# Patient Record
Sex: MALE | Race: WHITE | URBAN - METROPOLITAN AREA
[De-identification: names, ages, dates, MRNs, and addresses within clinical notes are randomized per-mention and may not be internally consistent; named-entity substitution may affect disease eponyms.]

---

## 2019-12-26 ENCOUNTER — OFFICE VISIT (OUTPATIENT)
Dept: FAMILY MEDICINE CLINIC | Age: 39
End: 2019-12-26
Payer: COMMERCIAL

## 2019-12-26 VITALS
OXYGEN SATURATION: 97 % | HEART RATE: 102 BPM | DIASTOLIC BLOOD PRESSURE: 80 MMHG | TEMPERATURE: 98 F | SYSTOLIC BLOOD PRESSURE: 110 MMHG | RESPIRATION RATE: 18 BRPM | WEIGHT: 205 LBS | HEIGHT: 68 IN | BODY MASS INDEX: 31.07 KG/M2

## 2019-12-26 DIAGNOSIS — B02.9 HERPES ZOSTER WITHOUT COMPLICATION: Primary | ICD-10-CM

## 2019-12-26 PROCEDURE — 99213 OFFICE O/P EST LOW 20 MIN: CPT | Performed by: NURSE PRACTITIONER

## 2019-12-26 RX ORDER — VALACYCLOVIR HYDROCHLORIDE 1 G/1
1000 TABLET, FILM COATED ORAL 3 TIMES DAILY
Qty: 21 TABLET | Refills: 0 | Status: SHIPPED | OUTPATIENT
Start: 2019-12-26 | End: 2020-01-02

## 2019-12-26 RX ORDER — HYDROCODONE BITARTRATE AND ACETAMINOPHEN 5; 325 MG/1; MG/1
1 TABLET ORAL EVERY 8 HOURS PRN
Qty: 15 TABLET | Refills: 0 | Status: SHIPPED | OUTPATIENT
Start: 2019-12-26 | End: 2019-12-31

## 2022-01-01 LAB
SARS-COV-2: DETECTED
SOURCE: 1

## 2024-01-10 ENCOUNTER — OFFICE VISIT (OUTPATIENT)
Dept: FAMILY MEDICINE CLINIC | Age: 44
End: 2024-01-10
Payer: COMMERCIAL

## 2024-01-10 VITALS
BODY MASS INDEX: 31.07 KG/M2 | SYSTOLIC BLOOD PRESSURE: 120 MMHG | DIASTOLIC BLOOD PRESSURE: 80 MMHG | HEART RATE: 84 BPM | TEMPERATURE: 97.8 F | HEIGHT: 68 IN | WEIGHT: 205 LBS | OXYGEN SATURATION: 96 %

## 2024-01-10 DIAGNOSIS — J01.90 ACUTE SINUSITIS, RECURRENCE NOT SPECIFIED, UNSPECIFIED LOCATION: Primary | ICD-10-CM

## 2024-01-10 DIAGNOSIS — R05.9 COUGH, UNSPECIFIED TYPE: ICD-10-CM

## 2024-01-10 LAB
Lab: NORMAL
PERFORMING INSTRUMENT: NORMAL
QC PASS/FAIL: NORMAL
SARS-COV-2, POC: NORMAL

## 2024-01-10 PROCEDURE — 99213 OFFICE O/P EST LOW 20 MIN: CPT

## 2024-01-10 PROCEDURE — 87426 SARSCOV CORONAVIRUS AG IA: CPT

## 2024-01-10 RX ORDER — CEFDINIR 300 MG/1
300 CAPSULE ORAL 2 TIMES DAILY
Qty: 20 CAPSULE | Refills: 0 | Status: SHIPPED | OUTPATIENT
Start: 2024-01-10 | End: 2024-01-20

## 2024-01-10 RX ORDER — CLOTRIMAZOLE AND BETAMETHASONE DIPROPIONATE 10; .64 MG/G; MG/G
CREAM TOPICAL 2 TIMES DAILY
COMMUNITY
Start: 2023-12-27

## 2024-01-10 RX ORDER — MIRTAZAPINE 15 MG/1
15 TABLET, FILM COATED ORAL NIGHTLY
COMMUNITY
Start: 2023-11-07

## 2024-01-10 RX ORDER — METHYLPREDNISOLONE 4 MG/1
TABLET ORAL
Qty: 1 KIT | Refills: 0 | Status: SHIPPED | OUTPATIENT
Start: 2024-01-10

## 2024-01-10 NOTE — PROGRESS NOTES
Chief Complaint       Pharyngitis (Drainage throat), Cough (Yellowish mucous), and Facial Pain (Pressure, all symptoms started about 4 days)    History of Present Illness   Source of history provided by:  patient.      Daniel Tanner is a 43 y.o. old male presenting to the walk in clinic for evaluation of sinus pressure, nasal congestion, discolored nasal drainage, bilateral ear pressure, mild non-productive cough x 4-5 days. Has been taking nothing OTC without relief. Denies any fever, chills, wheezing, CP, SOB, or GI symptoms. Denies any hx of asthma, COPD, or tobacco use. Pt has not been vaccinated for COVID-19.    ROS    Unless otherwise stated in this report or unable to obtain because of the patient's clinical or mental status as evidenced by the medical record, this patients's positive and negative responses for Review of Systems, constitutional, psych, eyes, ENT, cardiovascular, respiratory, gastrointestinal, neurological, genitourinary, musculoskeletal, integument systems and systems related to the presenting problem are either stated in the preceding or were not pertinent or were negative for the symptoms and/or complaints related to the medical problem.    Physical Exam         VS:  /80   Pulse 84   Temp 97.8 °F (36.6 °C) (Temporal)   Ht 1.727 m (5' 8\")   Wt 93 kg (205 lb)   SpO2 96%   BMI 31.17 kg/m²    Oxygen Saturation Interpretation: Normal.    Constitutional:  Alert, development consistent with age.  Head: Mild TTP over the maxillary sinuses.  Ears:  External Ears: Bilateral pinna normal. TMs translucent without erythema or perforation bilaterally.  Canals normal bilaterally without swelling or exudate  Nose:  Mild congestion of the nasal mucosa. There is no injection to middle turbinates bilaterally.   Throat: Mild posterior pharyngeal erythema with mild post nasal drip present.  No exudate or tonsillar hypertrophy noted.    Neck:  Supple. There is no anterior cervical

## 2025-02-12 ENCOUNTER — APPOINTMENT (OUTPATIENT)
Dept: GENERAL RADIOLOGY | Age: 45
End: 2025-02-12
Payer: COMMERCIAL

## 2025-02-12 ENCOUNTER — APPOINTMENT (OUTPATIENT)
Dept: CT IMAGING | Age: 45
End: 2025-02-12
Payer: COMMERCIAL

## 2025-02-12 ENCOUNTER — HOSPITAL ENCOUNTER (INPATIENT)
Age: 45
LOS: 1 days | Discharge: HOME | End: 2025-02-14
Attending: EMERGENCY MEDICINE | Admitting: HOSPITALIST
Payer: COMMERCIAL

## 2025-02-12 DIAGNOSIS — N28.9 KIDNEY LESION: ICD-10-CM

## 2025-02-12 DIAGNOSIS — R09.02 HYPOXIA: Primary | ICD-10-CM

## 2025-02-12 DIAGNOSIS — J18.9 PNEUMONIA DUE TO INFECTIOUS ORGANISM, UNSPECIFIED LATERALITY, UNSPECIFIED PART OF LUNG: ICD-10-CM

## 2025-02-12 LAB
ALBUMIN SERPL-MCNC: 4.2 G/DL (ref 3.5–5.2)
ALP SERPL-CCNC: 88 U/L (ref 40–129)
ALT SERPL-CCNC: 35 U/L (ref 0–40)
ANION GAP SERPL CALCULATED.3IONS-SCNC: 11 MMOL/L (ref 7–16)
AST SERPL-CCNC: 25 U/L (ref 0–39)
BASOPHILS # BLD: 0.04 K/UL (ref 0–0.2)
BASOPHILS NFR BLD: 0 % (ref 0–2)
BILIRUB SERPL-MCNC: 0.6 MG/DL (ref 0–1.2)
BUN SERPL-MCNC: 18 MG/DL (ref 6–20)
CALCIUM SERPL-MCNC: 9.1 MG/DL (ref 8.6–10.2)
CHLORIDE SERPL-SCNC: 104 MMOL/L (ref 98–107)
CO2 SERPL-SCNC: 24 MMOL/L (ref 22–29)
CREAT SERPL-MCNC: 0.9 MG/DL (ref 0.7–1.2)
EOSINOPHIL # BLD: 0.07 K/UL (ref 0.05–0.5)
EOSINOPHILS RELATIVE PERCENT: 1 % (ref 0–6)
ERYTHROCYTE [DISTWIDTH] IN BLOOD BY AUTOMATED COUNT: 13 % (ref 11.5–15)
GFR, ESTIMATED: >90 ML/MIN/1.73M2
GLUCOSE SERPL-MCNC: 101 MG/DL (ref 74–99)
HCT VFR BLD AUTO: 47.7 % (ref 37–54)
HGB BLD-MCNC: 16.1 G/DL (ref 12.5–16.5)
IMM GRANULOCYTES # BLD AUTO: 0.06 K/UL (ref 0–0.58)
IMM GRANULOCYTES NFR BLD: 0 % (ref 0–5)
LACTATE BLDV-SCNC: 1.6 MMOL/L (ref 0.5–2.2)
LYMPHOCYTES NFR BLD: 1.57 K/UL (ref 1.5–4)
LYMPHOCYTES RELATIVE PERCENT: 12 % (ref 20–42)
MAGNESIUM SERPL-MCNC: 1.9 MG/DL (ref 1.6–2.6)
MCH RBC QN AUTO: 30 PG (ref 26–35)
MCHC RBC AUTO-ENTMCNC: 33.8 G/DL (ref 32–34.5)
MCV RBC AUTO: 88.8 FL (ref 80–99.9)
MONOCYTES NFR BLD: 1.01 K/UL (ref 0.1–0.95)
MONOCYTES NFR BLD: 7 % (ref 2–12)
NEUTROPHILS NFR BLD: 80 % (ref 43–80)
NEUTS SEG NFR BLD: 10.91 K/UL (ref 1.8–7.3)
PLATELET # BLD AUTO: 330 K/UL (ref 130–450)
PMV BLD AUTO: 9.9 FL (ref 7–12)
POTASSIUM SERPL-SCNC: 3.8 MMOL/L (ref 3.5–5)
PROT SERPL-MCNC: 7.2 G/DL (ref 6.4–8.3)
RBC # BLD AUTO: 5.37 M/UL (ref 3.8–5.8)
SARS-COV-2 RDRP RESP QL NAA+PROBE: NOT DETECTED
SODIUM SERPL-SCNC: 139 MMOL/L (ref 132–146)
SPECIMEN DESCRIPTION: NORMAL
TROPONIN I SERPL HS-MCNC: 8 NG/L (ref 0–11)
WBC OTHER # BLD: 13.7 K/UL (ref 4.5–11.5)

## 2025-02-12 PROCEDURE — 71275 CT ANGIOGRAPHY CHEST: CPT

## 2025-02-12 PROCEDURE — 96361 HYDRATE IV INFUSION ADD-ON: CPT

## 2025-02-12 PROCEDURE — 80053 COMPREHEN METABOLIC PANEL: CPT

## 2025-02-12 PROCEDURE — 99285 EMERGENCY DEPT VISIT HI MDM: CPT

## 2025-02-12 PROCEDURE — 83605 ASSAY OF LACTIC ACID: CPT

## 2025-02-12 PROCEDURE — 83880 ASSAY OF NATRIURETIC PEPTIDE: CPT

## 2025-02-12 PROCEDURE — 85025 COMPLETE CBC W/AUTO DIFF WBC: CPT

## 2025-02-12 PROCEDURE — 83735 ASSAY OF MAGNESIUM: CPT

## 2025-02-12 PROCEDURE — 6370000000 HC RX 637 (ALT 250 FOR IP)

## 2025-02-12 PROCEDURE — 87040 BLOOD CULTURE FOR BACTERIA: CPT

## 2025-02-12 PROCEDURE — 93005 ELECTROCARDIOGRAM TRACING: CPT

## 2025-02-12 PROCEDURE — 6360000004 HC RX CONTRAST MEDICATION: Performed by: RADIOLOGY

## 2025-02-12 PROCEDURE — 74177 CT ABD & PELVIS W/CONTRAST: CPT

## 2025-02-12 PROCEDURE — 71045 X-RAY EXAM CHEST 1 VIEW: CPT

## 2025-02-12 PROCEDURE — 96365 THER/PROPH/DIAG IV INF INIT: CPT

## 2025-02-12 PROCEDURE — 84484 ASSAY OF TROPONIN QUANT: CPT

## 2025-02-12 PROCEDURE — 87635 SARS-COV-2 COVID-19 AMP PRB: CPT

## 2025-02-12 PROCEDURE — 2580000003 HC RX 258

## 2025-02-12 RX ORDER — 0.9 % SODIUM CHLORIDE 0.9 %
1000 INTRAVENOUS SOLUTION INTRAVENOUS ONCE
Status: COMPLETED | OUTPATIENT
Start: 2025-02-12 | End: 2025-02-13

## 2025-02-12 RX ORDER — IOPAMIDOL 755 MG/ML
75 INJECTION, SOLUTION INTRAVASCULAR
Status: COMPLETED | OUTPATIENT
Start: 2025-02-12 | End: 2025-02-12

## 2025-02-12 RX ORDER — IPRATROPIUM BROMIDE AND ALBUTEROL SULFATE 2.5; .5 MG/3ML; MG/3ML
3 SOLUTION RESPIRATORY (INHALATION) ONCE
Status: COMPLETED | OUTPATIENT
Start: 2025-02-12 | End: 2025-02-12

## 2025-02-12 RX ORDER — ACETAMINOPHEN 500 MG
1000 TABLET ORAL
Status: COMPLETED | OUTPATIENT
Start: 2025-02-12 | End: 2025-02-12

## 2025-02-12 RX ADMIN — IOPAMIDOL 75 ML: 755 INJECTION, SOLUTION INTRAVENOUS at 23:33

## 2025-02-12 RX ADMIN — IPRATROPIUM BROMIDE AND ALBUTEROL SULFATE 3 DOSE: .5; 2.5 SOLUTION RESPIRATORY (INHALATION) at 23:09

## 2025-02-12 RX ADMIN — SODIUM CHLORIDE 1000 ML: 9 INJECTION, SOLUTION INTRAVENOUS at 22:45

## 2025-02-12 RX ADMIN — ACETAMINOPHEN 1000 MG: 500 TABLET ORAL at 22:45

## 2025-02-12 ASSESSMENT — PAIN - FUNCTIONAL ASSESSMENT: PAIN_FUNCTIONAL_ASSESSMENT: 0-10

## 2025-02-12 ASSESSMENT — PAIN SCALES - GENERAL: PAINLEVEL_OUTOF10: 7

## 2025-02-13 PROBLEM — J18.9 PNEUMONIA DUE TO ORGANISM: Status: ACTIVE | Noted: 2025-02-13

## 2025-02-13 PROBLEM — E78.00 HYPERCHOLESTEROLEMIA: Status: ACTIVE | Noted: 2018-09-17

## 2025-02-13 PROBLEM — J96.01 ACUTE HYPOXEMIC RESPIRATORY FAILURE (HCC): Status: ACTIVE | Noted: 2025-02-13

## 2025-02-13 PROBLEM — E66.811 OBESITY, CLASS I, BMI 30-34.9: Status: ACTIVE | Noted: 2025-02-13

## 2025-02-13 LAB
B PARAP IS1001 DNA NPH QL NAA+NON-PROBE: NOT DETECTED
B PERT DNA SPEC QL NAA+PROBE: NOT DETECTED
BNP SERPL-MCNC: <36 PG/ML (ref 0–125)
C PNEUM DNA NPH QL NAA+NON-PROBE: NOT DETECTED
CRP SERPL HS-MCNC: 36 MG/L (ref 0–5)
EKG ATRIAL RATE: 97 BPM
EKG P AXIS: 53 DEGREES
EKG P-R INTERVAL: 130 MS
EKG Q-T INTERVAL: 344 MS
EKG QRS DURATION: 92 MS
EKG QTC CALCULATION (BAZETT): 436 MS
EKG R AXIS: 22 DEGREES
EKG T AXIS: 5 DEGREES
EKG VENTRICULAR RATE: 97 BPM
FLUAV RNA NPH QL NAA+NON-PROBE: NOT DETECTED
FLUBV RNA NPH QL NAA+NON-PROBE: NOT DETECTED
HADV DNA NPH QL NAA+NON-PROBE: NOT DETECTED
HCOV 229E RNA NPH QL NAA+NON-PROBE: NOT DETECTED
HCOV HKU1 RNA NPH QL NAA+NON-PROBE: NOT DETECTED
HCOV NL63 RNA NPH QL NAA+NON-PROBE: NOT DETECTED
HCOV OC43 RNA NPH QL NAA+NON-PROBE: NOT DETECTED
HMPV RNA NPH QL NAA+NON-PROBE: NOT DETECTED
HPIV1 RNA NPH QL NAA+NON-PROBE: NOT DETECTED
HPIV2 RNA NPH QL NAA+NON-PROBE: NOT DETECTED
HPIV3 RNA NPH QL NAA+NON-PROBE: NOT DETECTED
HPIV4 RNA NPH QL NAA+NON-PROBE: NOT DETECTED
M PNEUMO DNA NPH QL NAA+NON-PROBE: NOT DETECTED
PROCALCITONIN SERPL-MCNC: 0.3 NG/ML (ref 0–0.08)
RSV RNA NPH QL NAA+NON-PROBE: NOT DETECTED
RV+EV RNA NPH QL NAA+NON-PROBE: NOT DETECTED
SARS-COV-2 RNA NPH QL NAA+NON-PROBE: NOT DETECTED
SPECIMEN DESCRIPTION: NORMAL

## 2025-02-13 PROCEDURE — 86140 C-REACTIVE PROTEIN: CPT

## 2025-02-13 PROCEDURE — 6360000002 HC RX W HCPCS: Performed by: HOSPITALIST

## 2025-02-13 PROCEDURE — 2580000003 HC RX 258

## 2025-02-13 PROCEDURE — 0202U NFCT DS 22 TRGT SARS-COV-2: CPT

## 2025-02-13 PROCEDURE — 87205 SMEAR GRAM STAIN: CPT

## 2025-02-13 PROCEDURE — 2580000003 HC RX 258: Performed by: HOSPITALIST

## 2025-02-13 PROCEDURE — 1200000000 HC SEMI PRIVATE

## 2025-02-13 PROCEDURE — 6370000000 HC RX 637 (ALT 250 FOR IP): Performed by: HOSPITALIST

## 2025-02-13 PROCEDURE — 84145 PROCALCITONIN (PCT): CPT

## 2025-02-13 PROCEDURE — 87070 CULTURE OTHR SPECIMN AEROBIC: CPT

## 2025-02-13 PROCEDURE — 6360000002 HC RX W HCPCS

## 2025-02-13 PROCEDURE — 96361 HYDRATE IV INFUSION ADD-ON: CPT

## 2025-02-13 PROCEDURE — 99223 1ST HOSP IP/OBS HIGH 75: CPT | Performed by: HOSPITALIST

## 2025-02-13 PROCEDURE — 93010 ELECTROCARDIOGRAM REPORT: CPT | Performed by: INTERNAL MEDICINE

## 2025-02-13 PROCEDURE — 2500000003 HC RX 250 WO HCPCS: Performed by: HOSPITALIST

## 2025-02-13 PROCEDURE — 2700000000 HC OXYGEN THERAPY PER DAY

## 2025-02-13 RX ORDER — SODIUM CHLORIDE 9 MG/ML
INJECTION, SOLUTION INTRAVENOUS CONTINUOUS
Status: DISCONTINUED | OUTPATIENT
Start: 2025-02-13 | End: 2025-02-13

## 2025-02-13 RX ORDER — ROSUVASTATIN CALCIUM 20 MG/1
20 TABLET, COATED ORAL DAILY
COMMUNITY
Start: 2024-11-25

## 2025-02-13 RX ORDER — ACETAMINOPHEN 650 MG/1
650 SUPPOSITORY RECTAL EVERY 6 HOURS PRN
Status: DISCONTINUED | OUTPATIENT
Start: 2025-02-13 | End: 2025-02-14 | Stop reason: HOSPADM

## 2025-02-13 RX ORDER — ALBUTEROL SULFATE 0.83 MG/ML
2.5 SOLUTION RESPIRATORY (INHALATION) EVERY 6 HOURS PRN
Status: DISCONTINUED | OUTPATIENT
Start: 2025-02-13 | End: 2025-02-14 | Stop reason: HOSPADM

## 2025-02-13 RX ORDER — SODIUM CHLORIDE 9 MG/ML
INJECTION, SOLUTION INTRAVENOUS PRN
Status: DISCONTINUED | OUTPATIENT
Start: 2025-02-13 | End: 2025-02-14 | Stop reason: HOSPADM

## 2025-02-13 RX ORDER — DOXYCYCLINE 100 MG/1
100 CAPSULE ORAL EVERY 12 HOURS SCHEDULED
Status: DISCONTINUED | OUTPATIENT
Start: 2025-02-13 | End: 2025-02-14 | Stop reason: HOSPADM

## 2025-02-13 RX ORDER — ROSUVASTATIN CALCIUM 20 MG/1
20 TABLET, COATED ORAL DAILY
Status: DISCONTINUED | OUTPATIENT
Start: 2025-02-13 | End: 2025-02-14 | Stop reason: HOSPADM

## 2025-02-13 RX ORDER — ONDANSETRON 4 MG/1
4 TABLET, ORALLY DISINTEGRATING ORAL EVERY 8 HOURS PRN
Status: DISCONTINUED | OUTPATIENT
Start: 2025-02-13 | End: 2025-02-14 | Stop reason: HOSPADM

## 2025-02-13 RX ORDER — IPRATROPIUM BROMIDE AND ALBUTEROL SULFATE 2.5; .5 MG/3ML; MG/3ML
1 SOLUTION RESPIRATORY (INHALATION) EVERY 4 HOURS PRN
Status: DISCONTINUED | OUTPATIENT
Start: 2025-02-13 | End: 2025-02-14 | Stop reason: HOSPADM

## 2025-02-13 RX ORDER — DULOXETIN HYDROCHLORIDE 60 MG/1
60 CAPSULE, DELAYED RELEASE ORAL DAILY
Status: DISCONTINUED | OUTPATIENT
Start: 2025-02-13 | End: 2025-02-14 | Stop reason: HOSPADM

## 2025-02-13 RX ORDER — BENZONATATE 100 MG/1
100 CAPSULE ORAL 3 TIMES DAILY PRN
Status: DISCONTINUED | OUTPATIENT
Start: 2025-02-13 | End: 2025-02-14 | Stop reason: HOSPADM

## 2025-02-13 RX ORDER — SODIUM CHLORIDE 0.9 % (FLUSH) 0.9 %
5-40 SYRINGE (ML) INJECTION EVERY 12 HOURS SCHEDULED
Status: DISCONTINUED | OUTPATIENT
Start: 2025-02-13 | End: 2025-02-14 | Stop reason: HOSPADM

## 2025-02-13 RX ORDER — ONDANSETRON 2 MG/ML
4 INJECTION INTRAMUSCULAR; INTRAVENOUS EVERY 6 HOURS PRN
Status: DISCONTINUED | OUTPATIENT
Start: 2025-02-13 | End: 2025-02-14 | Stop reason: HOSPADM

## 2025-02-13 RX ORDER — POTASSIUM CHLORIDE 7.45 MG/ML
10 INJECTION INTRAVENOUS PRN
Status: DISCONTINUED | OUTPATIENT
Start: 2025-02-13 | End: 2025-02-14 | Stop reason: HOSPADM

## 2025-02-13 RX ORDER — MAGNESIUM SULFATE IN WATER 40 MG/ML
2000 INJECTION, SOLUTION INTRAVENOUS PRN
Status: DISCONTINUED | OUTPATIENT
Start: 2025-02-13 | End: 2025-02-14 | Stop reason: HOSPADM

## 2025-02-13 RX ORDER — POTASSIUM CHLORIDE 1500 MG/1
40 TABLET, EXTENDED RELEASE ORAL PRN
Status: DISCONTINUED | OUTPATIENT
Start: 2025-02-13 | End: 2025-02-14 | Stop reason: HOSPADM

## 2025-02-13 RX ORDER — KETOROLAC TROMETHAMINE 15 MG/ML
15 INJECTION, SOLUTION INTRAMUSCULAR; INTRAVENOUS EVERY 6 HOURS PRN
Status: DISCONTINUED | OUTPATIENT
Start: 2025-02-13 | End: 2025-02-14 | Stop reason: HOSPADM

## 2025-02-13 RX ORDER — SENNOSIDES A AND B 8.6 MG/1
1 TABLET, FILM COATED ORAL DAILY PRN
Status: DISCONTINUED | OUTPATIENT
Start: 2025-02-13 | End: 2025-02-14 | Stop reason: HOSPADM

## 2025-02-13 RX ORDER — ENOXAPARIN SODIUM 100 MG/ML
40 INJECTION SUBCUTANEOUS DAILY
Status: DISCONTINUED | OUTPATIENT
Start: 2025-02-13 | End: 2025-02-14 | Stop reason: DRUGHIGH

## 2025-02-13 RX ORDER — SODIUM CHLORIDE 0.9 % (FLUSH) 0.9 %
5-40 SYRINGE (ML) INJECTION PRN
Status: DISCONTINUED | OUTPATIENT
Start: 2025-02-13 | End: 2025-02-14 | Stop reason: HOSPADM

## 2025-02-13 RX ORDER — MAGNESIUM HYDROXIDE/ALUMINUM HYDROXICE/SIMETHICONE 120; 1200; 1200 MG/30ML; MG/30ML; MG/30ML
30 SUSPENSION ORAL EVERY 6 HOURS PRN
Status: DISCONTINUED | OUTPATIENT
Start: 2025-02-13 | End: 2025-02-14 | Stop reason: HOSPADM

## 2025-02-13 RX ORDER — ACETAMINOPHEN 325 MG/1
650 TABLET ORAL EVERY 6 HOURS PRN
Status: DISCONTINUED | OUTPATIENT
Start: 2025-02-13 | End: 2025-02-14 | Stop reason: HOSPADM

## 2025-02-13 RX ADMIN — DOXYCYCLINE HYCLATE 100 MG: 100 CAPSULE ORAL at 21:09

## 2025-02-13 RX ADMIN — AMPICILLIN SODIUM AND SULBACTAM SODIUM 3000 MG: 2; 1 INJECTION, POWDER, FOR SOLUTION INTRAMUSCULAR; INTRAVENOUS at 01:54

## 2025-02-13 RX ADMIN — BENZONATATE 100 MG: 100 CAPSULE ORAL at 06:13

## 2025-02-13 RX ADMIN — ENOXAPARIN SODIUM 40 MG: 100 INJECTION SUBCUTANEOUS at 08:06

## 2025-02-13 RX ADMIN — ROSUVASTATIN CALCIUM 20 MG: 20 TABLET, FILM COATED ORAL at 21:09

## 2025-02-13 RX ADMIN — SODIUM CHLORIDE, PRESERVATIVE FREE 10 ML: 5 INJECTION INTRAVENOUS at 21:09

## 2025-02-13 RX ADMIN — SODIUM CHLORIDE: 9 INJECTION, SOLUTION INTRAVENOUS at 06:02

## 2025-02-13 RX ADMIN — DULOXETINE 60 MG: 60 CAPSULE, DELAYED RELEASE ORAL at 08:06

## 2025-02-13 RX ADMIN — AMPICILLIN SODIUM AND SULBACTAM SODIUM 3000 MG: 2; 1 INJECTION, POWDER, FOR SOLUTION INTRAMUSCULAR; INTRAVENOUS at 14:11

## 2025-02-13 RX ADMIN — AMPICILLIN SODIUM AND SULBACTAM SODIUM 3000 MG: 2; 1 INJECTION, POWDER, FOR SOLUTION INTRAMUSCULAR; INTRAVENOUS at 21:20

## 2025-02-13 RX ADMIN — AMPICILLIN SODIUM AND SULBACTAM SODIUM 3000 MG: 2; 1 INJECTION, POWDER, FOR SOLUTION INTRAMUSCULAR; INTRAVENOUS at 08:10

## 2025-02-13 RX ADMIN — SODIUM CHLORIDE, PRESERVATIVE FREE 10 ML: 5 INJECTION INTRAVENOUS at 14:09

## 2025-02-13 RX ADMIN — DOXYCYCLINE HYCLATE 100 MG: 100 CAPSULE ORAL at 06:11

## 2025-02-13 ASSESSMENT — VISUAL ACUITY: OU: 1

## 2025-02-13 ASSESSMENT — PAIN SCALES - GENERAL: PAINLEVEL_OUTOF10: 0

## 2025-02-13 NOTE — PROGRESS NOTES
4 Eyes Skin Assessment     NAME:  Daniel Tanner  YOB: 1980  MEDICAL RECORD NUMBER:  [id number]    The patient is being assessed for  Admission    I agree that at least one RN has performed a thorough Head to Toe Skin Assessment on the patient. ALL assessment sites listed below have been assessed.      Areas assessed by both nurses:    Head, Face, Ears, Shoulders, Back, Chest, Arms, Elbows, Hands, Sacrum. Buttock, Coccyx, Ischium, and Legs. Feet and Heels        Does the Patient have a Wound? No noted wound(s)       Jhonathan Prevention initiated by RN: No  Wound Care Orders initiated by RN: No    Pressure Injury (Stage 3,4, Unstageable, DTI, NWPT, and Complex wounds) if present, place Wound referral order by RN under : No    New Ostomies, if present place, Ostomy referral order under : No     Nurse 1 eSignature: Electronically signed by Nyla Baldwin RN on 2/13/25 at 4:41 AM EST    **SHARE this note so that the co-signing nurse can place an eSignature**    Nurse 2 eSignature: Electronically signed by Luis Alfredo Edmondson RN on 2/13/25 at 4:49 AM EST

## 2025-02-13 NOTE — ED PROVIDER NOTES
[facility] EMERGENCY DEPARTMENT  EMERGENCY DEPARTMENT ENCOUNTER        Pt Name: Daniel Tanner  MRN: [id number]  Birthdate 1980  Date of evaluation: 2/12/2025  Provider: Brittany Mckeon DO  PCP: Don Emery DO  Note Started: 10:27 PM EST 2/12/25    CHIEF COMPLAINT       Chief Complaint   Patient presents with    Shortness of Breath     Colonoscopy today, sob with lungs hurting since       HISTORY OF PRESENT ILLNESS: 1 or more Elements   Daniel Tanner is a 44 y.o. male who presents to the emergency department with chief complaint of shortness of breath.  Patient states that he had a colonoscopy performed earlier today.  After he came to, patient states that his lungs felt like they were on fire anytime he had taken a deep breath.  This was attributed to the anesthesia and was told that it would wear off.  Patient was discharged home.  After he went home, he states that he just felt increasingly worse.  He had been attempting to sleep and wife states that his respirations sound gargled and that he was feeling very chilled and requiring several blankets.  They also state that he was diaphoretic however they had not taken his temperature at home.  In the ER patient states that he just feels shortness of breath and unwell.  He is otherwise denying any chest pain, abdominal pain, nausea, vomiting, lower extremity edema, or headaches    Nursing Notes were all reviewed and agreed with or any disagreements were addressed in the HPI.    REVIEW OF SYSTEMS :    Positives and Pertinent negatives as per HPI.    PAST MEDICAL HISTORY/Chronic Conditions Affecting Care    has no past medical history on file.     SURGICAL HISTORY     Past Surgical History:   Procedure Laterality Date    CHOLECYSTECTOMY      VASECTOMY         CURRENTMEDICATIONS       Previous Medications    CLOTRIMAZOLE-BETAMETHASONE (LOTRISONE) 1-0.05 % CREAM    Apply topically 2 times daily APPLY TO AFFECTED AREA

## 2025-02-13 NOTE — ED NOTES
ED to Inpatient Handoff Report    Notified Trish that electronic handoff available and patient ready for transport to room 537.    Safety Risks: None identified    Patient in Restraints: no    Constant Observer or Patient : no    Telemetry Monitoring Ordered: Yes          Order to transfer to unit without monitor: NO    Last MEWS: 2 Time completed: 0203         Vitals:    02/13/25 0055 02/13/25 0056 02/13/25 0129 02/13/25 0203   BP:    114/78   Pulse: 91 87 86 83   Resp: 20 19 21 21   Temp:    97.7 °F (36.5 °C)   TempSrc:    Oral   SpO2: 95% 95% 94% 95%   Weight:       Height:           Opportunity for questions and clarification was provided.

## 2025-02-13 NOTE — ED NOTES
Pt SpoO2 88-89% RA. Pt placed on 2L NC with no improvement. Pt on 4L O2 NC with SpO2 95%. Dr. Mckeon and Dr. Garrido notified.

## 2025-02-13 NOTE — CONSULTS
Edmund Lang M.D.,[facility]  Kaveh Silverio D.O., SHANNEN., [facility]  Simi Palma M.D.  Megan Gonzalez M.D.   Darius Renteria D.O.  Anselmo Mckeon M.D.       Patient:  Daniel Tanner 44 y.o. male MRN: [id number]           PULMONARY CONSULTATION    Reason for Consultation: acute hypoxic resp failure after colonoscopy. ? PNA and untreated SEEMA  Referring Physician:  Dr Kathy Marcum DO    Communication with the referring physician will be sent via the electronic medical record.    Chief Complaint: hypoxia and chest burning    CODE STATUS: FULL     SUBJECTIVE:  HPI:  Daniel Tanner is a 44 y.o. male who we are asked to evaluate for  acute hypoxic resp failure after colonoscopy. ? PNA and untreated SEEMA.  He is not previously known to our pulmonary service.  He has a past medical history significant for obesity BMI of 33.60, hypercholesterolemia, pneumonia, SEEMA. Non smoker, no hx of asthma or COPD.    He presented to the ED at [facility] 2/12/2025 for worsening shortness of breath and burning sensation in his lungs. Immediately after his colonoscopy procedure he felt that he did not feel right. He was discharged home and started noticing chills. +cough, he felt as if he had fluid in his lungs.   Outpatient Colonoscopy on 2/11/2025.  He was told after the procedure he probably has sleep apnea.   Concern for underlying SEEMA with witnessed apneas and nocturnal hypoxia.  Denies abdominal pain nausea or vomiting.    Lab testing blood cultures pending, WBCs 13.7, hemoglobin 16.1, sodium 139, potassium 3.8, BUN 18 creatinine 0.9, high-sensitivity troponin 8, lactic acid 1.6, proBNP less than 36.  Twelve-lead EKG sinus rhythm heart rate 97, QTc 436 ms.  Rapid COVID negative.    Radiology review-CTA chest no evidence of PE.  Nodular groundglass opacities bibasilar lung field suggestive of atypical infectious inflammatory process.  CT abdomen and pelvis with no acute findings.    He had an episode of hypoxia overnight 88% on  room air at midnight with sleep placed on 4 L now weaned to 3 L nasal cannula, SpO2 97%.  Initial temperature on arrival to .2 °F.  No fevers overnight. Placed on Unasyn for aspiration coverage, doxycycline for CAP coverage receiving fluids for gentle hydration.      History reviewed. No pertinent past medical history.  Obesity BMI 33.60, hypercholesterolemia, prior history of pneumonia, concern for SEEMA  Past Surgical History:   Procedure Laterality Date    CHOLECYSTECTOMY      VASECTOMY         History reviewed. No pertinent family history.    Social History:   Social History     Socioeconomic History    Marital status:      Spouse name: Not on file    Number of children: Not on file    Years of education: Not on file    Highest education level: Not on file   Occupational History    Not on file   Tobacco Use    Smoking status: Never    Smokeless tobacco: Current   Substance and Sexual Activity    Alcohol use: Yes     Comment: SOCIALLY    Drug use: No    Sexual activity: Not on file   Other Topics Concern    Not on file   Social History Narrative    Not on file     Social Determinants of Health     Financial Resource Strain: Not on file   Food Insecurity: Not on file   Transportation Needs: Not on file   Physical Activity: Not on file   Stress: Not on file   Social Connections: Not on file   Intimate Partner Violence: Not on file   Housing Stability: Not on file     Smoking history: The patient is a lifelong non-smoker    ETOH:   reports current alcohol use.    Exposures: There  is no known exposure to tb or asbestos    Vaccines:      Immunization History   Administered Date(s) Administered    COVID-19, PFIZER GRAY top, DO NOT Dilute, (age 12 y+), IM, 30 mcg/0.3 mL 01/25/2022    COVID-19, PFIZER PURPLE top, DILUTE for use, (age 12 y+), 30mcg/0.3mL 01/04/2022    Influenza, FLUARIX, FLULAVAL, FLUZONE (age 6 mo+) and AFLURIA, (age 3 y+), Quadv PF, 0.5mL 10/02/2018, 10/24/2020    Influenza, FLUCELVAX, (age

## 2025-02-13 NOTE — CARE COORDINATION
2/13/2025 Social Work Discharge Planning: Pneumonia. Aspiration? IV fluids.Pulm is consulted.This worker met with Pt to discuss  role and transition of care/discharge planning.Pt is independent from home with his spouse and uses no DME. Pt had a colonoscopy yesterday and today they believe Pt may have aspirated.  Pt is on 3l o2 here and uses none at home. Wean o2 as tolerated. Pharmacy is [facility] in [place] and PCP is Dr. GLORIA Emery. Electronically signed by YOVANI Rasmussen on 2/13/2025 at 11:44 AM

## 2025-02-13 NOTE — PROGRESS NOTES
Pox room air sitting 95%  Pox room air ambulating dropped to 88%  Pox room air recovery 92%  Pox ambulating 2 liters 97%.  Pt ambulated in hallways

## 2025-02-13 NOTE — H&P
[facility] Hospitalist Group History and Physical      ASSESSMENT:      Principal Problem:    Pneumonia due to organism  Active Problems:    Acute hypoxemic respiratory failure    Hypercholesterolemia    Obesity, Class I, BMI 30-34.9  Resolved Problems:    * No resolved hospital problems. *      PLAN:    Acute Hypoxemic Respiratory Failure: present on admission, 2/2 penumonia not on supplemental oxygen at home, initial SpO2 88% on room air, presently patient requiring 4L with an SpO2 of 95%, Continuous pulse oximetry, Cont supplemental O2, titrate to an SpO2 of  90-92%   Bilateral pneumonia: present on admission, continue Unasyn q 6hr, add doxycycline q 12hr.   HLD: continue statin  SEEMA: Diagnosed while under sedation.  Strongly encouraged patient to follow-up with PCP for referral to sleep medicine for PSG.  Class I obesity: BMI 33.6.  Counseled on diet and lifestyle modifications.    Consultations Ordered:  None    Code Status: Full Code   VTE prophylaxis: Lovenox SQ daily  Dispo: admit to med tele         CHIEF COMPLAINT:  had concerns including Shortness of Breath (Colonoscopy today, sob with lungs hurting since).    History of Present Illness:      44 y.o. male PMH of HLD presented to the ED for shortness of breath and burning sensation in chest.  Patient was in his usual state of health when he went for screening colonoscopy yesterday morning.  He was told he has sleep apnea.  When he came to postprocedure he noted having burning sensation in his lungs.  After getting home he had difficulty breathing is coughing up clear sputum.  He has no history of structural lung disease.  He is a non-smoker.  At time of evaluation, patient was in no apparent distress.        REVIEW OF SYSTEMS:  A comprehensive review of systems was negative except for: what is in the HPI      PMH:  History reviewed. No pertinent past medical history.    Surgical History:  Past Surgical History:   Procedure Laterality Date

## 2025-02-13 NOTE — PLAN OF CARE
Problem: Discharge Planning  Goal: Discharge to home or other facility with appropriate resources  2/13/2025 1724 by Brandi Wen RN  Outcome: Progressing  2/13/2025 0453 by Nyla Baldwin RN  Outcome: Progressing     Problem: Pain  Goal: Verbalizes/displays adequate comfort level or baseline comfort level  2/13/2025 1724 by Brandi Wen RN  Outcome: Progressing  2/13/2025 0453 by Nyla Baldwin RN  Outcome: Progressing

## 2025-02-13 NOTE — PROGRESS NOTES
Order for outpatient sleep study noted. Contacted [facility], face sheet and order faxed to [phone].

## 2025-02-14 VITALS
OXYGEN SATURATION: 97 % | BODY MASS INDEX: 34.01 KG/M2 | RESPIRATION RATE: 16 BRPM | TEMPERATURE: 97.7 F | DIASTOLIC BLOOD PRESSURE: 86 MMHG | HEART RATE: 72 BPM | WEIGHT: 224.43 LBS | SYSTOLIC BLOOD PRESSURE: 116 MMHG | HEIGHT: 68 IN

## 2025-02-14 LAB
ANION GAP SERPL CALCULATED.3IONS-SCNC: 8 MMOL/L (ref 7–16)
BASOPHILS # BLD: 0.04 K/UL (ref 0–0.2)
BASOPHILS NFR BLD: 0 % (ref 0–2)
BUN SERPL-MCNC: 15 MG/DL (ref 6–20)
CALCIUM SERPL-MCNC: 8.7 MG/DL (ref 8.6–10.2)
CHLORIDE SERPL-SCNC: 105 MMOL/L (ref 98–107)
CO2 SERPL-SCNC: 24 MMOL/L (ref 22–29)
CREAT SERPL-MCNC: 0.9 MG/DL (ref 0.7–1.2)
EOSINOPHIL # BLD: 0.34 K/UL (ref 0.05–0.5)
EOSINOPHILS RELATIVE PERCENT: 4 % (ref 0–6)
ERYTHROCYTE [DISTWIDTH] IN BLOOD BY AUTOMATED COUNT: 13.2 % (ref 11.5–15)
GFR, ESTIMATED: >90 ML/MIN/1.73M2
GLUCOSE SERPL-MCNC: 104 MG/DL (ref 74–99)
HCT VFR BLD AUTO: 44.7 % (ref 37–54)
HGB BLD-MCNC: 14.3 G/DL (ref 12.5–16.5)
IMM GRANULOCYTES # BLD AUTO: 0.04 K/UL (ref 0–0.58)
IMM GRANULOCYTES NFR BLD: 0 % (ref 0–5)
LYMPHOCYTES NFR BLD: 2.21 K/UL (ref 1.5–4)
LYMPHOCYTES RELATIVE PERCENT: 24 % (ref 20–42)
MCH RBC QN AUTO: 28.9 PG (ref 26–35)
MCHC RBC AUTO-ENTMCNC: 32 G/DL (ref 32–34.5)
MCV RBC AUTO: 90.3 FL (ref 80–99.9)
MICROORGANISM/AGENT SPEC: ABNORMAL
MONOCYTES NFR BLD: 0.69 K/UL (ref 0.1–0.95)
MONOCYTES NFR BLD: 8 % (ref 2–12)
NEUTROPHILS NFR BLD: 64 % (ref 43–80)
NEUTS SEG NFR BLD: 5.83 K/UL (ref 1.8–7.3)
PLATELET # BLD AUTO: 299 K/UL (ref 130–450)
PMV BLD AUTO: 9.9 FL (ref 7–12)
POTASSIUM SERPL-SCNC: 3.9 MMOL/L (ref 3.5–5)
RBC # BLD AUTO: 4.95 M/UL (ref 3.8–5.8)
SODIUM SERPL-SCNC: 137 MMOL/L (ref 132–146)
SPECIMEN DESCRIPTION: ABNORMAL
WBC OTHER # BLD: 9.2 K/UL (ref 4.5–11.5)

## 2025-02-14 PROCEDURE — 80048 BASIC METABOLIC PNL TOTAL CA: CPT

## 2025-02-14 PROCEDURE — 2700000000 HC OXYGEN THERAPY PER DAY

## 2025-02-14 PROCEDURE — 6370000000 HC RX 637 (ALT 250 FOR IP): Performed by: HOSPITALIST

## 2025-02-14 PROCEDURE — 2580000003 HC RX 258: Performed by: HOSPITALIST

## 2025-02-14 PROCEDURE — 99233 SBSQ HOSP IP/OBS HIGH 50: CPT | Performed by: STUDENT IN AN ORGANIZED HEALTH CARE EDUCATION/TRAINING PROGRAM

## 2025-02-14 PROCEDURE — 2500000003 HC RX 250 WO HCPCS: Performed by: HOSPITALIST

## 2025-02-14 PROCEDURE — 6360000002 HC RX W HCPCS: Performed by: HOSPITALIST

## 2025-02-14 PROCEDURE — 85025 COMPLETE CBC W/AUTO DIFF WBC: CPT

## 2025-02-14 RX ORDER — ENOXAPARIN SODIUM 100 MG/ML
30 INJECTION SUBCUTANEOUS 2 TIMES DAILY
Status: DISCONTINUED | OUTPATIENT
Start: 2025-02-14 | End: 2025-02-14 | Stop reason: HOSPADM

## 2025-02-14 RX ORDER — ALBUTEROL SULFATE 90 UG/1
2 INHALANT RESPIRATORY (INHALATION) EVERY 6 HOURS PRN
Qty: 18 G | Refills: 0 | Status: SHIPPED | OUTPATIENT
Start: 2025-02-14

## 2025-02-14 RX ADMIN — DOXYCYCLINE HYCLATE 100 MG: 100 CAPSULE ORAL at 08:18

## 2025-02-14 RX ADMIN — AMPICILLIN SODIUM AND SULBACTAM SODIUM 3000 MG: 2; 1 INJECTION, POWDER, FOR SOLUTION INTRAMUSCULAR; INTRAVENOUS at 08:19

## 2025-02-14 RX ADMIN — SODIUM CHLORIDE, PRESERVATIVE FREE 10 ML: 5 INJECTION INTRAVENOUS at 08:18

## 2025-02-14 RX ADMIN — ENOXAPARIN SODIUM 30 MG: 100 INJECTION SUBCUTANEOUS at 08:18

## 2025-02-14 RX ADMIN — DULOXETINE 60 MG: 60 CAPSULE, DELAYED RELEASE ORAL at 08:18

## 2025-02-14 RX ADMIN — AMPICILLIN SODIUM AND SULBACTAM SODIUM 3000 MG: 2; 1 INJECTION, POWDER, FOR SOLUTION INTRAMUSCULAR; INTRAVENOUS at 02:14

## 2025-02-14 NOTE — DISCHARGE SUMMARY
dilatation. GI/bowel: No evidence of bowel obstruction.  Appendix is normal. Pelvis: Bladder is unremarkable.  Coarse calcifications are present within a normal sized prostate. Peritoneum/retroperitoneum: No free intraperitoneal air or fluid.  No lymphadenopathy in the abdomen or pelvis.  No abdominal aortic aneurysm or atherosclerotic calcifications.  No bowel containing hernia. Bones/soft tissues: No acute osseous abnormality.     CTA CHEST: 1.  No evidence of pulmonary embolism. 2.  Nodular ground-glass opacities in the bibasilar lungs are most suggestive of an atypical infectious or inflammatory process. CT ABDOMEN AND PELVIS: 1.  No acute findings in the abdomen or pelvis. 2.  Soft tissue density lesion in the left kidney measuring 1.4 cm is statistically favored to represent a proteinaceous/hemorrhagic cyst. However, ultrasound is recommended to exclude a solid lesion.     XR CHEST PORTABLE    Result Date: 2/12/2025  EXAMINATION: ONE XRAY VIEW OF THE CHEST 2/12/2025 10:39 pm COMPARISON: None. HISTORY: ORDERING SYSTEM PROVIDED HISTORY: r/o pneumonia TECHNOLOGIST PROVIDED HISTORY: Reason for exam:->r/o pneumonia FINDINGS: The lungs are without acute focal process.  Left lung atelectasis.  There is no effusion or pneumothorax. The cardiomediastinal silhouette is without acute process. The osseous structures are without acute process.     No acute process. Left lung atelectasis.       Patient Instructions:      Medication List        START taking these medications      albuterol sulfate  (90 Base) MCG/ACT inhaler  Commonly known as: Proventil HFA  Inhale 2 puffs into the lungs every 6 hours as needed for Wheezing or Shortness of Breath     amoxicillin-clavulanate 875-125 MG per tablet  Commonly known as: AUGMENTIN  Take 1 tablet by mouth 2 times daily for 7 days            CONTINUE taking these medications      DULoxetine 60 MG extended release capsule  Commonly known as: CYMBALTA     rosuvastatin 20 MG  tablet  Commonly known as: CRESTOR            STOP taking these medications      clotrimazole-betamethasone 1-0.05 % cream  Commonly known as: LOTRISONE     methylPREDNISolone 4 MG tablet  Commonly known as: MEDROL DOSEPACK     mirtazapine 15 MG tablet  Commonly known as: REMERON               Where to Get Your Medications        These medications were sent to [id number] - [place], [place] - [address] [phone] - F [phone]  [address] [address]      Phone: [phone]   albuterol sulfate  (90 Base) MCG/ACT inhaler  amoxicillin-clavulanate 875-125 MG per tablet           Note that more than 30 minutes was spent in preparing discharge papers, discussing discharge with patient, medication review, etc.    Signed:  Electronically signed by Kathy Marcum DO on 2/14/2025 at 1:41 PM

## 2025-02-14 NOTE — PLAN OF CARE
Problem: Discharge Planning  Goal: Discharge to home or other facility with appropriate resources  2/14/2025 0059 by Nyla Baldwin RN  Outcome: Progressing  2/13/2025 1724 by Brandi Wen RN  Outcome: Progressing     Problem: Pain  Goal: Verbalizes/displays adequate comfort level or baseline comfort level  2/14/2025 0059 by Nyla Baldwin, RN  Outcome: Progressing  2/13/2025 1724 by Brandi Wen RN  Outcome: Progressing

## 2025-02-14 NOTE — PROGRESS NOTES
[facility] Hospitalist Progress Note    Admitting Date and Time: 2/12/2025 10:11 PM  Admit Dx: Pneumonia due to organism [J18.9]  Hypoxia [R09.02]  Pneumonia due to infectious organism, unspecified laterality, unspecified part of lung [J18.9]    Subjective:  Patient is being followed for Pneumonia due to organism [J18.9]  Hypoxia [R09.02]  Pneumonia due to infectious organism, unspecified laterality, unspecified part of lung [J18.9]   Pt was seen and examined.     ROS: denies fever, chills, cp, sob, n/v, HA unless stated above.      enoxaparin  30 mg SubCUTAneous BID    ampicillin-sulbactam  3,000 mg IntraVENous Q6H    rosuvastatin  20 mg Oral Daily    DULoxetine  60 mg Oral Daily    sodium chloride flush  5-40 mL IntraVENous 2 times per day    doxycycline  100 mg Oral 2 times per day     ipratropium 0.5 mg-albuterol 2.5 mg, 1 Dose, Q4H PRN  sodium chloride flush, 5-40 mL, PRN  sodium chloride, , PRN  potassium chloride, 40 mEq, PRN   Or  potassium alternative oral replacement, 40 mEq, PRN   Or  potassium chloride, 10 mEq, PRN  magnesium sulfate, 2,000 mg, PRN  ondansetron, 4 mg, Q8H PRN   Or  ondansetron, 4 mg, Q6H PRN  melatonin, 3 mg, Nightly PRN  senna, 1 tablet, Daily PRN  aluminum & magnesium hydroxide-simethicone, 30 mL, Q6H PRN  acetaminophen, 650 mg, Q6H PRN   Or  acetaminophen, 650 mg, Q6H PRN  ketorolac, 15 mg, Q6H PRN  benzonatate, 100 mg, TID PRN  albuterol, 2.5 mg, Q6H PRN         Objective:    /86   Pulse 72   Temp 97.7 °F (36.5 °C) (Oral)   Resp 16   Ht 1.727 m (5' 8\")   Wt 101.8 kg (224 lb 6.9 oz)   SpO2 97%   BMI 34.12 kg/m²     General Appearance: alert and oriented to person, place and time and in no acute distress  Skin: warm and dry  Head: normocephalic and atraumatic  Eyes: pupils equal, round, and reactive to light, extraocular eye movements intact, conjunctivae normal  Neck: neck supple and non tender without mass   Pulmonary/Chest: clear to auscultation bilaterally- no

## 2025-02-14 NOTE — PROGRESS NOTES
Message sent to Dr. Marcum about pulm signing off, and patient being on RA. Will await response back.

## 2025-02-14 NOTE — PROGRESS NOTES
Edmund Lang M.D.,[facility]  Kaveh Silverio D.O., SHANNEN., [facility]  Laurel Palma M.D.  Megan Gonzalez M.D.   Darius Renteria D.O.  Anselmo Mckeon M.D.         Daily Pulmonary Progress Note    Patient:  Daniel Tanner 44 y.o. male MRN: [id number]            Synopsis     We are following patient for acute hypoxic respiratory failure after colonoscopy concern for aspiration event, untreated SEEMA    \"CC\" hypoxia    Code status: Full      Subjective      Patient was seen and examined.  Feeling better, oxygen weaning off.  Starting to cough up mucus, resp culture in process. SPO 2 99% on RA. Await ambulatory O2 testing.  No further fevers.  Breathing feels improved.  Tolerating Unasyn and doxycycline.      Review of Systems:  Constitutional: Denies fever, weight loss, night sweats, and fatigue  Skin: Denies pigmentation, dark lesions, and rashes   HEENT: Denies hearing loss, tinnitus, ear drainage, epistaxis, sore throat, and hoarseness.  Cardiovascular: Denies palpitations, chest pain, and chest pressure.  Respiratory: Denies cough, dyspnea at rest, hemoptysis, apnea, and choking.  Gastrointestinal: Denies nausea, vomiting, poor appetite, diarrhea, heartburn or reflux  Genitourinary: Denies dysuria, frequency, urgency or hematuria  Musculoskeletal: Denies myalgias, muscle weakness, and bone pain  Neurological: Denies dizziness, vertigo, headache, and focal weakness  Psychological: Denies anxiety and depression  Endocrine: Denies heat intolerance and cold intolerance  Hematopoietic/Lymphatic: Denies bleeding problems and blood transfusions    24-hour events:  Improved O2    Objective   OBJECTIVE:   /86   Pulse 72   Temp 97.7 °F (36.5 °C) (Oral)   Resp 16   Ht 1.727 m (5' 8\")   Wt 101.8 kg (224 lb 6.9 oz)   SpO2 97%   BMI 34.12 kg/m²   SpO2 Readings from Last 1 Encounters:   02/14/25 97%        I/O:    Intake/Output Summary (Last 24 hours) at 2/14/2025 0951  Last data filed at 2/13/2025 1366  Gross     WBC 9.2 02/14/2025 03:46 AM    RBC 4.95 02/14/2025 03:46 AM    HGB 14.3 02/14/2025 03:46 AM    HCT 44.7 02/14/2025 03:46 AM    MCV 90.3 02/14/2025 03:46 AM    MCH 28.9 02/14/2025 03:46 AM    MCHC 32.0 02/14/2025 03:46 AM    RDW 13.2 02/14/2025 03:46 AM     02/14/2025 03:46 AM    MPV 9.9 02/14/2025 03:46 AM     Lab Results   Component Value Date/Time     02/14/2025 03:46 AM    K 3.9 02/14/2025 03:46 AM     02/14/2025 03:46 AM    CO2 24 02/14/2025 03:46 AM    BUN 15 02/14/2025 03:46 AM    CREATININE 0.9 02/14/2025 03:46 AM    CALCIUM 8.7 02/14/2025 03:46 AM    LABGLOM >90 02/14/2025 03:46 AM     No results found for: \"PROTIME\", \"INR\"  Recent Labs     02/12/25  2233   PROBNP <36     No results for input(s): \"TROPONINI\" in the last 72 hours.  Recent Labs     02/13/25  1005   PROCAL 0.30*     This SmartLink has not been configured with any valid records.       Micro:  No results for input(s): \"CULTRESP\" in the last 72 hours.  No results for input(s): \"LABGRAM\" in the last 72 hours.  No results for input(s): \"LEGUR\" in the last 72 hours.  No results for input(s): \"STREPNEUMAGU\" in the last 72 hours.  No results for input(s): \"LP1UAG\" in the last 72 hours.  2/13/25 resp culture in process  Resp viral panel negative   Assessment:    Acute respiratory insufficiency with hypoxia post colonoscopy-resolved   Possible aspiration event after OP colonoscopy 2/11/25  Bilateral GGO opacities  Linear left lung consolidation vs atelectasis  Concern for underlying SEEMA with witnessed apneas and nocturnal hypoxia  Mild leukocytosis resolved  Mild dehydration resolved  Obesity BMI 33.60  Hyperlipidemia      Plan:   Wean off oxygen keep SpO2 greater than 92%.  Orders placed for ambulatory O2 testing prior to discharge  Placed on Unasyn, doxycycline for treatment of possible pneumonia.  Possible aspiration event from recent outpatient colonoscopy. May transition to augmentin for dc to complete 7 total days  Resp  None known

## 2025-02-14 NOTE — CARE COORDINATION
2/14/2025  Social Work Discharge Planning:Pneumonia. Plan is home with spouse and possible home o2. Pt is on 3l o2 and uses none at home. Wean o2 as tolerated. Pulse ox testing. MELODY made a referral to [facility]. Electronically signed by YOVANI Rasmussen on 2/14/2025 at 9:34 AM

## 2025-02-14 NOTE — PROGRESS NOTES
DVT Prophylaxis Adjustment Policy (DVT Prophylaxis)     This patient is on DVT Prophylaxis medication that requires a dose adjustment      Date Body Weight IBW  Adjusted BW SCr  CrCl Dialysis status   2/14/2025 101.8 kg (224 lb 6.9 oz) Ideal body weight: 68.4 kg (150 lb 12.7 oz)  Adjusted ideal body weight: 81.8 kg (180 lb 4 oz) Serum creatinine: 0.9 mg/dL 02/14/25 0346  Estimated creatinine clearance: 119 mL/min N/a       Pharmacy has dose-adjusted the DVT Prophylaxis regimen to match   the recommendations from the following table        Ordered Medication:Lovenox 40mg daily    Order Changed/converted to: Lovenox 30mg BID      These changes were made per protocol according to the [facility] Pharmacist   Review for Appropriate Use and Automatic Dose Adjustments of   Subcutaneous Anticoagulants Policy     *Please note this dose may need readjusted if patient's condition changes.    Please contact pharmacy with any questions regarding these changes.    david bennett RPH  2/14/2025  6:52 AM

## 2025-02-14 NOTE — PROGRESS NOTES
PULSE OX ON ROOM AIR SITTING 99%   PULSE OX ON ROOM AIR AMBULATING 94%   PULE OX ON ROOM AIR AFTER AMBULATION 97%

## 2025-02-16 LAB
MICROORGANISM SPEC CULT: NORMAL
MICROORGANISM SPEC CULT: NORMAL
SERVICE CMNT-IMP: NORMAL
SERVICE CMNT-IMP: NORMAL
SPECIMEN DESCRIPTION: NORMAL
SPECIMEN DESCRIPTION: NORMAL

## 2025-02-16 NOTE — PROGRESS NOTES
Physician Progress Note      PATIENT:               JAVI CHAVES  [facility] #:                  [id number]  :                       1980  ADMIT DATE:       2025 10:11 PM  DISCH DATE:        2025 2:03 PM  RESPONDING  PROVIDER #:        Kathy Marcum DO          QUERY TEXT:    Pt admitted with respiratory failure.  Pt noted to have documentation of Acute   aspiration Pneumonitis vs PNA. If possible, please document in the progress   notes and discharge summary if you are evaluating and/or treating any of the   following:    Note: CAP and HCAP indicate where the pneumonia was acquired, not a specific   type.    The medical record reflects the following:  Risk Factors: recent colonoscopy  Clinical Indicators: ED \"Patient's chest x-ray did not demonstrate any acute   process however subsequent CTA pulm demonstrated some groundglass opacities in   the bibasilar suggestive for an atypical pneumonia.  Especially given the   clinical setting as patient did become hypoxic at 88 to 89% on room air and   was requiring supplemental oxygen is felt that he may have aspirated during   his colonoscopy earlier.\"  Pulmonology \"Possible aspiration event after OP colonoscopy 25.\"   PN \"Acute aspiration Pneumonitis vs PNA.\"  Treatment: IV Unasyn and Doxy  Options provided:  -- Aspiration pneumonia  -- Gram negative pneumonia  -- Gram positive pneumonia  -- Other - I will add my own diagnosis  -- Disagree - Not applicable / Not valid  -- Disagree - Clinically unable to determine / Unknown  -- Refer to Clinical Documentation Reviewer    PROVIDER RESPONSE TEXT:    Aspiration Pneumonitis    Query created by: Pamela Cortes on 2025 2:27 PM      Electronically signed by:  Kathy Marcum DO 2025 2:49 PM           1 pair

## 2025-03-12 ENCOUNTER — HOSPITAL ENCOUNTER (OUTPATIENT)
Dept: SLEEP CENTER | Age: 45
Discharge: HOME | End: 2025-03-12
Payer: COMMERCIAL

## 2025-03-12 DIAGNOSIS — G47.33 OBSTRUCTIVE SLEEP APNEA (ADULT) (PEDIATRIC): Primary | ICD-10-CM

## 2025-03-12 PROCEDURE — 95811 POLYSOM 6/>YRS CPAP 4/> PARM: CPT

## 2025-03-13 VITALS
OXYGEN SATURATION: 95 % | DIASTOLIC BLOOD PRESSURE: 84 MMHG | SYSTOLIC BLOOD PRESSURE: 120 MMHG | WEIGHT: 221 LBS | HEIGHT: 68 IN | HEART RATE: 82 BPM | BODY MASS INDEX: 33.49 KG/M2

## 2025-03-13 ASSESSMENT — SLEEP AND FATIGUE QUESTIONNAIRES
HOW LIKELY ARE YOU TO NOD OFF OR FALL ASLEEP IN A CAR, WHILE STOPPED FOR A FEW MINUTES IN TRAFFIC: WOULD NEVER DOZE
HOW LIKELY ARE YOU TO NOD OFF OR FALL ASLEEP WHILE SITTING AND TALKING TO SOMEONE: WOULD NEVER DOZE
ESS TOTAL SCORE: 9
HOW LIKELY ARE YOU TO NOD OFF OR FALL ASLEEP WHILE LYING DOWN TO REST IN THE AFTERNOON WHEN CIRCUMSTANCES PERMIT: HIGH CHANCE OF DOZING
HOW LIKELY ARE YOU TO NOD OFF OR FALL ASLEEP WHILE SITTING QUIETLY AFTER LUNCH WITHOUT ALCOHOL: SLIGHT CHANCE OF DOZING
HOW LIKELY ARE YOU TO NOD OFF OR FALL ASLEEP WHILE SITTING AND READING: MODERATE CHANCE OF DOZING
HOW LIKELY ARE YOU TO NOD OFF OR FALL ASLEEP WHILE WATCHING TV: MODERATE CHANCE OF DOZING
HOW LIKELY ARE YOU TO NOD OFF OR FALL ASLEEP WHILE SITTING INACTIVE IN A PUBLIC PLACE: SLIGHT CHANCE OF DOZING
HOW LIKELY ARE YOU TO NOD OFF OR FALL ASLEEP WHEN YOU ARE A PASSENGER IN A CAR FOR AN HOUR WITHOUT A BREAK: WOULD NEVER DOZE

## 2025-03-28 ENCOUNTER — HOSPITAL ENCOUNTER (OUTPATIENT)
Age: 45
Discharge: HOME | End: 2025-03-30
Payer: COMMERCIAL

## 2025-03-28 ENCOUNTER — HOSPITAL ENCOUNTER (OUTPATIENT)
Dept: GENERAL RADIOLOGY | Age: 45
Discharge: HOME | End: 2025-03-30
Payer: COMMERCIAL

## 2025-03-28 DIAGNOSIS — J18.9 PNEUMONIA OF BOTH LUNGS DUE TO INFECTIOUS ORGANISM, UNSPECIFIED PART OF LUNG: ICD-10-CM

## 2025-03-28 PROCEDURE — 71046 X-RAY EXAM CHEST 2 VIEWS: CPT

## 2025-04-02 PROBLEM — G47.30 OBESITY WITH SLEEP APNEA: Status: ACTIVE | Noted: 2025-04-02

## 2025-04-02 PROBLEM — E66.9 OBESITY WITH SLEEP APNEA: Status: ACTIVE | Noted: 2025-04-02

## 2025-04-02 PROBLEM — G47.33 OSA ON CPAP: Status: ACTIVE | Noted: 2025-04-02
